# Patient Record
Sex: MALE | Race: BLACK OR AFRICAN AMERICAN | Employment: FULL TIME | URBAN - METROPOLITAN AREA
[De-identification: names, ages, dates, MRNs, and addresses within clinical notes are randomized per-mention and may not be internally consistent; named-entity substitution may affect disease eponyms.]

---

## 2017-07-04 ENCOUNTER — HOSPITAL ENCOUNTER (EMERGENCY)
Age: 42
Discharge: HOME | End: 2017-07-04
Attending: EMERGENCY MEDICINE
Payer: COMMERCIAL

## 2017-07-04 VITALS
RESPIRATION RATE: 18 BRPM | SYSTOLIC BLOOD PRESSURE: 138 MMHG | WEIGHT: 230 LBS | BODY MASS INDEX: 32.2 KG/M2 | TEMPERATURE: 98.1 F | DIASTOLIC BLOOD PRESSURE: 88 MMHG | HEART RATE: 84 BPM | HEIGHT: 71 IN | OXYGEN SATURATION: 98 %

## 2017-07-04 DIAGNOSIS — T78.40XA ALLERGIC REACTION, INITIAL ENCOUNTER: Primary | ICD-10-CM

## 2017-07-04 PROCEDURE — 74011250637 HC RX REV CODE- 250/637: Performed by: EMERGENCY MEDICINE

## 2017-07-04 PROCEDURE — 99283 EMERGENCY DEPT VISIT LOW MDM: CPT

## 2017-07-04 RX ORDER — DIPHENHYDRAMINE HCL 25 MG
50 CAPSULE ORAL
Status: COMPLETED | OUTPATIENT
Start: 2017-07-04 | End: 2017-07-04

## 2017-07-04 RX ORDER — TRIAMCINOLONE ACETONIDE 5 MG/G
CREAM TOPICAL 2 TIMES DAILY
Qty: 15 G | Refills: 0 | Status: SHIPPED | OUTPATIENT
Start: 2017-07-04

## 2017-07-04 RX ORDER — FAMOTIDINE 20 MG/1
20 TABLET, FILM COATED ORAL
Status: COMPLETED | OUTPATIENT
Start: 2017-07-04 | End: 2017-07-04

## 2017-07-04 RX ADMIN — DIPHENHYDRAMINE HYDROCHLORIDE 50 MG: 25 CAPSULE ORAL at 01:22

## 2017-07-04 RX ADMIN — FAMOTIDINE 20 MG: 20 TABLET ORAL at 01:22

## 2017-07-04 NOTE — Clinical Note
SPECIAL DISCHARGE INSTRUCTIONS AND COMMENTS: 
 
General comments: Thank you for allowing us to provide you with excellent care today. We hope we addressed all of your concerns and needs. We strive to provide excellent quality care in the Emergency Depart ment. If you feel that you have not received excellent quality care or timely care, please ask to speak to the nurse manager. Please choose us in the future for your continued health care needs. Follow-up comments: The exam and treatment you rece ived in the Emergency Department were for an urgent problem that may be new for you and / or one which may be related to a worsening of a chronic or ongoing medical problem that you already had prior to this visit. In any case, today's treatment is not i ntended to be considered as complete care in all cases and thus, it is important that you follow-up with a doctor, nurse practitioner, or physicians assistant to: (1) confirm your diagnosis, (2) re-evaluation of changes in your illness and treatment, an d (3) for ongoing care. In some cases we may have contacted your doctor or a specific specialist who may be involved in your future evaluation and care but in any case, take this sheet with you when you go to your follow-up visit or refer to the infor samson on these sheets when you are calling to arrange an appointment - it may prove helpful in making the appointment. Prescribed Medications: Unless you have been directed by the provider to change your current medicines, you should continue to prosper e them as before. If you have been prescribed medicines, please take them as directed. In some cases, these new medicines are intended to replace a medicine that you are currently taking and if so, this will be noted below.  
 
 Our expectation is that y our condition will improve by following the doctor's recommendations, however, if in the event your condition worsens or does not improve as expected, please follow-up with your PCP or if unable to reach your usual health care provider, you should return  to the Emergency Department. We are available 24 hours a day.   
 
SPECIFIC INSTRUCTIONS PROVIDED BY YOUR DOCTOR, Yan Plummer MD:

## 2017-07-04 NOTE — DISCHARGE INSTRUCTIONS
Allergic Reaction: Care Instructions  Your Care Instructions  An allergic reaction is an excessive response from your immune system to a medicine, chemical, food, insect bite, or other substance. A reaction can range from mild to life-threatening. Some people have a mild rash, hives, and itching or stomach cramps. In severe reactions, swelling of your tongue and throat can close up your airway so that you cannot breathe. Follow-up care is a key part of your treatment and safety. Be sure to make and go to all appointments, and call your doctor if you are having problems. It's also a good idea to know your test results and keep a list of the medicines you take. How can you care for yourself at home? · If you know what caused your allergic reaction, be sure to avoid it. Your allergy may become more severe each time you have a reaction. · Take an over-the-counter antihistamine, such as cetirizine (Zyrtec) or loratadine (Claritin), to treat mild symptoms. Read and follow directions on the label. Some antihistamines can make you feel sleepy. Do not give antihistamines to a child unless you have checked with your doctor first. Mild symptoms include sneezing or an itchy or runny nose; an itchy mouth; a few hives or mild itching; and mild nausea or stomach discomfort. · Do not scratch hives or a rash. Put a cold, moist towel on them or take cool baths to relieve itching. Put ice packs on hives, swelling, or insect stings for 10 to 15 minutes at a time. Put a thin cloth between the ice pack and your skin. Do not take hot baths or showers. They will make the itching worse. · Your doctor may prescribe a shot of epinephrine to carry with you in case you have a severe reaction. Learn how to give yourself the shot and keep it with you at all times. Make sure it is not . · Go to the emergency room every time you have a severe reaction, even if you have used your shot of epinephrine and are feeling better. Symptoms can come back after a shot. · Wear medical alert jewelry that lists your allergies. You can buy this at most drugstores. · If your child has a severe allergy, make sure that his or her teachers, babysitters, coaches, and other caregivers know about the allergy. They should have an epinephrine shot, know how and when to give it, and have a plan to take your child to the hospital.  When should you call for help? Give an epinephrine shot if:  · You think you are having a severe allergic reaction. · You have symptoms in more than one body area, such as mild nausea and an itchy mouth. After giving an epinephrine shot call 911, even if you feel better. Call 911 if:  · You have symptoms of a severe allergic reaction. These may include:  ¨ Sudden raised, red areas (hives) all over your body. ¨ Swelling of the throat, mouth, lips, or tongue. ¨ Trouble breathing. ¨ Passing out (losing consciousness). Or you may feel very lightheaded or suddenly feel weak, confused, or restless. · You have been given an epinephrine shot, even if you feel better. Call your doctor now or seek immediate medical care if:  · You have symptoms of an allergic reaction, such as:  ¨ A rash or hives (raised, red areas on the skin). ¨ Itching. ¨ Swelling. ¨ Belly pain, nausea, or vomiting. Watch closely for changes in your health, and be sure to contact your doctor if:  · You do not get better as expected. Where can you learn more? Go to http://bruce-meredith.info/. Enter I499 in the search box to learn more about \"Allergic Reaction: Care Instructions. \"  Current as of: April 3, 2017  Content Version: 11.3  © 2831-4677 [email or web address]. Care instructions adapted under license by [employer] (which disclaims liability or warranty for this information).  If you have questions about a medical condition or this instruction, always ask your healthcare professional. [address] any warranty or liability for your use of this information.

## 2017-07-04 NOTE — ED PROVIDER NOTES
HPI Comments: 3:72 AM    Dione Fleischer is a 42 y.o. male with no pertinent PMHx, presenting ambulatory to the ED c/o pruritic rash to his sides and bilateral ankles x two days. Pt states that the areas burn when he takes a shower. Pt states that he was outside for a cookout two days ago and began itching the day after. Pt notes using a new detergent over the next few days. Pt denies trying any medications for his sxs. Pt denies any history of similar sxs. Pt specifically denies any fever/chills, SOB, trouble swallowing or N/V/D.    PCP: None  Social Hx: - tobacco use, + social alcohol use, - illicit drug use    There are no other complaints, changes, or physical findings at this time. The history is provided by the patient. No  was used. History reviewed. No pertinent past medical history. History reviewed. No pertinent surgical history. History reviewed. No pertinent family history. Social History     Social History    Marital status: SINGLE     Spouse name: N/A    Number of children: N/A    Years of education: N/A     Occupational History    Not on file. Social History Main Topics    Smoking status: Never Smoker    Smokeless tobacco: Never Used    Alcohol use Yes      Comment: socially    Drug use: Not on file    Sexual activity: Not on file     Other Topics Concern    Not on file     Social History Narrative    No narrative on file         ALLERGIES: Review of patient's allergies indicates no known allergies. Review of Systems   Constitutional: Negative for chills and fever. HENT: Negative for trouble swallowing. Respiratory: Negative for shortness of breath. Gastrointestinal: Negative for diarrhea, nausea and vomiting. Skin: Positive for rash. All other systems reviewed and are negative.       Vitals:    07/04/17 0059   BP: 138/88   Pulse: 84   Resp: 18   Temp: 98.1 °F (36.7 °C)   SpO2: 98%   Weight: 104.3 kg (230 lb)   Height: 5' 11\" (1.803 m)            Physical Exam   Nursing note and vitals reviewed. -------------------------PHYSICAL EXAM-------------------------    Vital signs and nursing notes reviewed  Nursing note and VS were reviewed      CONSTITUTIONAL: Mental status: Awake and alert. No immediate acute distress. Non-toxic in appearance. Well developed, well nourished and appears adequately hydrated. HEAD: Normocephalic, Atraumatic. EYES: Pupils are equal, round and reactive. Extra-ocular movements intact. Sclera are non icteric. Conjunctiva not injected. ENT: Mucous membranes are moist. No mucosal swelling. Oral mucosa: There is no erythema or swelling; No oral lesions or thrush. Tonsillar tissue: NO enlargement of the tonsillar tissue or the presence of exudates. Ears: R:  EAC - clear, no swelling with TM that is normal in appearance. L:  EAC - clear, no swelling with TM that is normal in appearance. Nasal mucosa pink with no discharge and the turbinates are of normal size. NECK: Normal ROM. Neck is supple. Anterior aspect: Anterior cervical adenopathy: nothing abnormal.  No obvious enlargement of the thyroid. Trachea is midline. Posterior aspect: Posterior cervical paraspinal muscles are non tender and  bony midline is non tender. Posterior adenopathy: none palpable. CARDIOVASCULAR:  The rhythm is regular and the rate sounds to be normal. No murmurs, rubs or gallops. Distal pulses are 2+ and equal.    PULMONARY: Respiratory effort is normal and without the use of accessory muscles. Patient is speaking in full sentences. Air exchange is good. Breath sounds:  Clear to auscultation bilaterally. No wheezing, rales or rhonchi. CHEST WALL: Normal shape;  non tender to palpation; no crepitus    ABDOMINAL: Visual: non -distended; Ausculation: Bowel sounds are present. Palpation: Soft; No obvious organomegaly; Palpable tenderness: NONE.  NO peritoneal signs - No rebound, guarding or rigidity. BACK: Midline - No bony tenderness; Paraspinal muscles are non tender. Full range of motion. No CVA tenderness. MUSCULOSKELETAL:   Lower Extremities: Peripheral edema- none noted; In general there is No obvious soft tissue tenderness or sites of bony tenderness or deformities;   Joints: No evidence of acute inflammatory changes and have good range of motion in all extremities. Muscles: Good tone and No obvious muscle tenderness. Upper Extremities: Peripheral edema- none noted; In general there is No obvious soft tissue tenderness or sites of bony tenderness or deformities;   Joints: No evidence of acute inflammatory changes and have good range of motion in all extremities. Muscles: Good tone and No obvious muscle tenderness. SKIN:  Good skin turgor. Cap Refill is Normal. Skin is warm and dry and with NO diaphoresis. Skin with urticarial-like rash eruption, with mild excoriation, with distribution involving both hips, buttocks and ankles. NEUROLOGICAL: Alert, awake and appropriately oriented. Normal speech. CN's are normal;  Motor - no focal weakness; no obvious sensory loss; Cerebellar function- intact; DTR's - 2+ equal    PSYCH: Appropriate affect; normal thought content; no expressed suicidal ideation. MDM  Number of Diagnoses or Management Options  Diagnosis management comments: INITIAL CLINICAL IMPRESSION and PLANS:  The patient presents with the primary complaint(s) of: allergic reaction. The presentation, to include historical aspects and clinical findings appear to be consistent with the DX of local dermatitis likely due to new laundry detergent. However, other possible DX's to consider as primary, associated with, or exacerbated by include:    1.   Nonspecific urticaria      Considering the above, my initial management plan to evaluate and therapeutic interventions include: Initiate Medications and or IV Fluids,  As well as those noted in the orders:    Rosalina Batch Daniel Lema MD     ED Course       Procedures    FINAL CLINICAL IMPRESSION AND DISPOSITION DECISION  - DISCHARGE:  1:24 AM  I reviewed our electronic medical record system for any past medical records that were available that may contribute to the patients current condition, the nursing notes and vital signs from today's visit. Based on the clinical presentation and findings the clinical impression noted below is straight forward. Studies indicated and / or done during this ED encounter:   NONE INDICATED AT THIS TIME    RESULTS:   No orders to display       Labs Reviewed - No data to display   No results found for this or any previous visit (from the past 12 hour(s)). Intervention(s) while in ED: NONE INDICATED AT THIS TIME    MEDICATIONS GIVEN:   Medications   diphenhydrAMINE (BENADRYL) capsule 50 mg (50 mg Oral Given 7/4/17 0122)   famotidine (PEPCID) tablet 20 mg (20 mg Oral Given 7/4/17 0122)       Present condition:  STABLE    Planned Treatment Management Upon Discharge: SYMPTOMATIC TREATMENT    DISCUSSION RELATED TO ENCOUNTER: Encounter was straightforward and as noted above. SPECIFIC CONVERSATIONS:     I feel that we have optimized outpatient assessment and management such that Jessica Borges is stable to be discharged and to continue with him care or complete any additional evaluation as appropriate at home or as an outpatient. DISCHARGE NOTE:   Tamela Johnssaviying's  results have been reviewed with him. He  has been counseled regarding his  diagnosis. He  verbally conveys understanding and agreement of the signs, symptoms, diagnosis, treatment and prognosis and additionally agrees to follow up as recommended with None  In 24 - 48 hours. He  also agrees with the care-plan and conveys that all of his questions have been answered.   I have also put together some discharge instructions for him that include: 1) educational information regarding their diagnosis, 2) how to care for their diagnosis at home, as well a 3) list of reasons why they would want to return to the ED prior to their follow-up appointment, should their condition change. The patient and/or family has been provided with education for proper Emergency Department utilization. CLINICAL IMPRESSION  1. Allergic reaction, initial encounter          PLAN:  1. D/C home  2. Discharge Medication List as of 7/4/2017  1:23 AM      START taking these medications    Details   triamcinolone (ARISTOCORT) 0.5 % topical cream Apply  to affected area two (2) times a day. use thin layer, Normal, Disp-15 g, R-0           3. Follow-up Information     Follow up With Details Comments Contact Info    [facility] Schedule an appointment as soon as possible for a visit for PCP follow up, at the [facility] [address] [facility], [address] [facility] [address] [facility] [address]    [facility] EMERG[facility]Y DE[facility]  As needed, If symptoms worsen [address] [id number] [phone]        Return if sxs worsen    ATTESTATIONS:  This note is prepared by Erick Fernandez, acting as Scribe for Ivette Vickers MD.     Ivette Vickers MD: The scribe's documentation has been prepared under my direction and personally reviewed by me in its entirety. I confirm that the note above accurately reflects all work, treatment, procedures, and medical decision making performed by me.

## 2017-07-04 NOTE — ED NOTES
Presented to ED to be evaluated for reported rash to bilateral feet/ankles and buttocks x 2 days. Patient denies any c/o pain, but states, \"it itches real bad\". Patient denies any new known substances and/or food. Patient denies any c/o SOB and/or difficulty breathing at this time. Patient is noted to have raised areas to bilateral feet/ankles and buttocks. Patient is noted to be up to position of comfort with no s/s distress noted.

## 2019-10-30 ENCOUNTER — APPOINTMENT (OUTPATIENT)
Dept: GENERAL RADIOLOGY | Age: 44
End: 2019-10-30
Attending: EMERGENCY MEDICINE
Payer: COMMERCIAL

## 2019-10-30 ENCOUNTER — HOSPITAL ENCOUNTER (EMERGENCY)
Age: 44
Discharge: HOME | End: 2019-10-30
Attending: EMERGENCY MEDICINE
Payer: COMMERCIAL

## 2019-10-30 VITALS
WEIGHT: 225 LBS | HEIGHT: 70 IN | SYSTOLIC BLOOD PRESSURE: 142 MMHG | TEMPERATURE: 97.8 F | RESPIRATION RATE: 16 BRPM | HEART RATE: 67 BPM | OXYGEN SATURATION: 100 % | BODY MASS INDEX: 32.21 KG/M2 | DIASTOLIC BLOOD PRESSURE: 80 MMHG

## 2019-10-30 DIAGNOSIS — S29.012A UPPER BACK STRAIN, INITIAL ENCOUNTER: ICD-10-CM

## 2019-10-30 DIAGNOSIS — S39.012A LOW BACK STRAIN, INITIAL ENCOUNTER: Primary | ICD-10-CM
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
## 2019-10-30 PROCEDURE — 99282 EMERGENCY DEPT VISIT SF MDM: CPT

## 2019-10-30 PROCEDURE — 72110 X-RAY EXAM L-2 SPINE 4/>VWS: CPT

## 2019-10-30 RX ORDER — METHOCARBAMOL 500 MG/1
500 TABLET, FILM COATED ORAL 4 TIMES DAILY
Qty: 20 TAB | Refills: 0 | Status: SHIPPED | OUTPATIENT
Start: 2019-10-30 | End: 2019-11-04

## 2019-10-30 RX ORDER — IBUPROFEN 600 MG/1
600 TABLET ORAL
Qty: 20 TAB | Refills: 0 | Status: SHIPPED | OUTPATIENT
Start: 2019-10-30

## 2019-10-30 NOTE — LETTER
[facility] [place] 
[facility] 
[address] [address] [phone] Work/School Note Date: 10/30/2019 To Whom It May concern: 
 
Kyle Barrientos was seen and treated today in the emergency room by the following provider(s): 
Attending Provider: Gloria Bueno Physician Assistant: Barry Curry PA-C. Kyle Barrientos may return to work on 11/01/2019. Sincerely, Prince Arnold PA-C
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
## 2020-03-30 ENCOUNTER — HOSPITAL ENCOUNTER (EMERGENCY)
Age: 45
Discharge: HOME | End: 2020-03-30
Attending: EMERGENCY MEDICINE
Payer: COMMERCIAL

## 2020-03-30 VITALS
DIASTOLIC BLOOD PRESSURE: 83 MMHG | TEMPERATURE: 98.4 F | OXYGEN SATURATION: 100 % | BODY MASS INDEX: 32.93 KG/M2 | HEART RATE: 88 BPM | RESPIRATION RATE: 17 BRPM | HEIGHT: 70 IN | SYSTOLIC BLOOD PRESSURE: 140 MMHG | WEIGHT: 230 LBS

## 2020-03-30 DIAGNOSIS — E11.9 TYPE 2 DIABETES MELLITUS WITHOUT COMPLICATION, WITHOUT LONG-TERM CURRENT USE OF INSULIN (HCC): Primary | ICD-10-CM

## 2020-03-30 LAB — GLUCOSE BLD STRIP.AUTO-MCNC: 215 MG/DL (ref 70–110)

## 2020-03-30 PROCEDURE — 99283 EMERGENCY DEPT VISIT LOW MDM: CPT

## 2020-03-30 PROCEDURE — 82962 GLUCOSE BLOOD TEST: CPT

## 2020-03-30 PROCEDURE — 74011250637 HC RX REV CODE- 250/637: Performed by: EMERGENCY MEDICINE

## 2020-03-30 RX ORDER — ACETAMINOPHEN 500 MG
1000 TABLET ORAL ONCE
Status: COMPLETED | OUTPATIENT
Start: 2020-03-30 | End: 2020-03-30

## 2020-03-30 RX ORDER — METFORMIN HYDROCHLORIDE 500 MG/1
500 TABLET ORAL 2 TIMES DAILY WITH MEALS
Qty: 60 TAB | Refills: 0 | Status: SHIPPED | OUTPATIENT
Start: 2020-03-30 | End: 2020-04-29

## 2020-03-30 RX ADMIN — ACETAMINOPHEN 1000 MG: 500 TABLET ORAL at 08:10

## 2020-03-30 NOTE — ED TRIAGE NOTES
Patient with c/o high blood sugar.  Denies prescribed medication use or checking sugars on a regular basis

## 2020-03-30 NOTE — DISCHARGE INSTRUCTIONS
Please call or go through your insurance company to schedule primary care follow-up for management of your high blood sugar and diabetes.

## 2020-03-30 NOTE — ED PROVIDER NOTES
EMERGENCY DEPARTMENT HISTORY AND PHYSICAL EXAM    Date: 3/30/2020  Patient Name: Greg Moyer    History of Presenting Illness     Chief Complaint   Patient presents with    High Blood Sugar         History Provided By: Patient    [id number]  Greg Moyer is a 38 y.o. male with PMHX of diabetes who presents to the emergency department C/O high blood sugar. Patient says that today he is feeling under weather, described as general fatigue and generalized headache. Headache gradual nature and not acute. States in the past when he felt this way he was told that his blood sugar has been high. He says he was prescribed metformin but never took it. Patient says he does not have a primary care doctor. He says he does not feel well enough to go to work today. Last meal was 5 PM yesterday evening. PCP: None    Current Outpatient Medications   Medication Sig Dispense Refill    metFORMIN (GLUCOPHAGE) 500 mg tablet Take 1 Tab by mouth two (2) times daily (with meals) for 30 days. 60 Tab 0    ibuprofen (MOTRIN) 600 mg tablet Take 1 Tab by mouth every six (6) hours as needed for Pain. Take with food. 20 Tab 0    triamcinolone (ARISTOCORT) 0.5 % topical cream Apply  to affected area two (2) times a day. use thin layer 15 g 0       Past History     Past Medical History:  History reviewed. No pertinent past medical history. Past Surgical History:  Past Surgical History:   Procedure Laterality Date    HX ORTHOPAEDIC         Family History:  History reviewed. No pertinent family history. Social History:  Social History     Tobacco Use    Smoking status: Never Smoker    Smokeless tobacco: Never Used   Substance Use Topics    Alcohol use: Yes     Comment: socially    Drug use: Not on file       Allergies:  No Known Allergies      Review of Systems   Review of Systems   Constitutional: Positive for fatigue. Respiratory: Negative for shortness of breath and wheezing. Cardiovascular: Negative for chest pain. Gastrointestinal: Negative for constipation, diarrhea, nausea and vomiting. Musculoskeletal: Negative for neck pain and neck stiffness. Neurological: Positive for headaches. Negative for dizziness and syncope. Physical Exam     Vitals:    03/30/20 0728   BP: 140/83   Pulse: 88   Resp: 17   Temp: 98.4 °F (36.9 °C)   SpO2: 100%   Weight: 104.3 kg (230 lb)   Height: 5' 10\" (1.778 m)     Physical Exam  Vitals signs and nursing note reviewed. Constitutional:       General: He is not in acute distress. Appearance: He is well-groomed and overweight. HENT:      Head: Normocephalic and atraumatic. Eyes:      Conjunctiva/sclera: Conjunctivae normal.      Pupils: Pupils are equal, round, and reactive to light. Neck:      Musculoskeletal: Normal range of motion and neck supple. Cardiovascular:      Rate and Rhythm: Normal rate and regular rhythm. Heart sounds: Normal heart sounds. Pulmonary:      Effort: Pulmonary effort is normal. No respiratory distress. Breath sounds: Normal breath sounds. No wheezing or rales. Chest:      Chest wall: No tenderness. Abdominal:      General: There is no distension. Palpations: Abdomen is soft. Tenderness: There is no abdominal tenderness. There is no guarding or rebound. Musculoskeletal: Normal range of motion. General: No tenderness. Lymphadenopathy:      Cervical: No cervical adenopathy. Skin:     General: Skin is warm and dry. Neurological:      General: No focal deficit present. Mental Status: He is alert and oriented to person, place, and time. Cranial Nerves: No cranial nerve deficit. Motor: No abnormal muscle tone.       Coordination: Coordination normal.   Psychiatric:         Mood and Affect: Mood normal.         Behavior: Behavior normal.             Diagnostic Study Results     Labs -     Recent Results (from the past 12 hour(s))   GLUCOSE, POC    Collection Time: 03/30/20  7:49 AM   Result Value Ref Range    Glucose (POC) 215 (H) 70 - 110 mg/dL       Radiologic Studies -   No orders to display     CT Results  (Last 48 hours)    None        CXR Results  (Last 48 hours)    None          Medications given in the ED-  Medications   acetaminophen (TYLENOL) tablet 1,000 mg (1,000 mg Oral Given 3/30/20 0810)         Medical Decision Making   I am the first provider for this patient. I reviewed the vital signs, available nursing notes, past medical history, past surgical history, family history and social history. Vital Signs-Reviewed the patient's vital signs. Pulse Oximetry Analysis - 100% on RA       Records Reviewed: Nursing Notes    Provider Notes (Medical Decision Making): Saritha Stephen is a 38 y.o. male with complaints of generalized headache and concerns for high blood sugar. He has no red flag symptoms or historical concerns for his headache. His vital signs are within normal limits. He does not check his blood sugar at home is not on any medications. His blood sugar this morning is greater than 200 and he has been fasting since 5 PM last night. Discussed this with patient that he meets criteria for diabetes and needs to follow-up with a primary care physician for management of this. We will start him on metformin and I gave patient dietary instructions. I have referred him to primary care provider here however he does have Elaine Leys and I suggested that he call them to have them set up a referral for him as well. He is also requesting a work note which is provided. Procedures:  Procedures    ED Course:       Diagnosis and Disposition     Critical Care:     DISCHARGE NOTE:    Artur Mayer's  results have been reviewed with him. He has been counseled regarding his diagnosis, treatment, and plan. He verbally conveys understanding and agreement of the signs, symptoms, diagnosis, treatment and prognosis and additionally agrees to follow up as discussed.   He also agrees with the care-plan and conveys that all of his questions have been answered. I have also provided discharge instructions for him that include: educational information regarding their diagnosis and treatment, and list of reasons why they would want to return to the ED prior to their follow-up appointment, should his condition change. He has been provided with education for proper emergency department utilization. CLINICAL IMPRESSION:    1. Type 2 diabetes mellitus without complication, without long-term current use of insulin ([facility] [address]        PLAN:  1. D/C Home  2. Current Discharge Medication List      START taking these medications    Details   metFORMIN (GLUCOPHAGE) 500 mg tablet Take 1 Tab by mouth two (2) times daily (with meals) for 30 days. Qty: 60 Tab, Refills: 0           3. Follow-up Information     Follow up With Specialties Details Why Contact Info    Navi Tabares MD Internal Medicine Schedule an appointment as soon as possible for a visit  For primary care follow up Tara Haile Anselmo  [phone]          _______________________________      Please note that this dictation was completed with K[employer], the computer voice recognition software. Quite often unanticipated grammatical, syntax, homophones, and other interpretive errors are inadvertently transcribed by the computer software. Please disregard these errors. Please excuse any errors that have escaped final proofreading.

## 2020-03-30 NOTE — LETTER
NOTIFICATION RETURN TO WORK / SCHOOL 
 
3/30/2020 8:06 AM 
 
Mr. Saritha Stephen [address] [facility] To Whom It May Concern: 
 
Saritha Stephen is currently under the care of [facility] EMERGENCY DEPT. He will return to work/school on: 3/31 If there are questions or concerns please have the patient contact our office.  
 
 
 
Sincerely, 
 
 
Montrell Allan MD

## 2020-03-30 NOTE — ED NOTES
Pt presents to ED with co HA x 1 day, pt rates pain 7/10, pt sts the last time he felt like this he went to pt first and was told he had diabetes and given RX for \"a pill\", pt denies filling rx given to him, pt educated on importance of managing DM, pt verbalized understanding, pt is able to make needs known speaking in complete sentences, pt in nad at this time

## 2021-12-20 ENCOUNTER — APPOINTMENT (OUTPATIENT)
Dept: GENERAL RADIOLOGY | Age: 46
End: 2021-12-20
Attending: PHYSICIAN ASSISTANT
Payer: MEDICAID

## 2021-12-20 ENCOUNTER — HOSPITAL ENCOUNTER (EMERGENCY)
Age: 46
Discharge: HOME | End: 2021-12-20
Attending: STUDENT IN AN ORGANIZED HEALTH CARE EDUCATION/TRAINING PROGRAM
Payer: MEDICAID

## 2021-12-20 VITALS
SYSTOLIC BLOOD PRESSURE: 140 MMHG | BODY MASS INDEX: 32.93 KG/M2 | DIASTOLIC BLOOD PRESSURE: 89 MMHG | RESPIRATION RATE: 16 BRPM | TEMPERATURE: 98.6 F | HEIGHT: 70 IN | HEART RATE: 68 BPM | WEIGHT: 230 LBS | OXYGEN SATURATION: 100 %

## 2021-12-20 DIAGNOSIS — R05.9 COUGH: Primary | ICD-10-CM

## 2021-12-20 DIAGNOSIS — Z20.822 PERSON UNDER INVESTIGATION FOR COVID-19: ICD-10-CM

## 2021-12-20 LAB
S PYO AG THROAT QL: NEGATIVE
SARS-COV-2, COV2: NORMAL

## 2021-12-20 PROCEDURE — 87880 STREP A ASSAY W/OPTIC: CPT

## 2021-12-20 PROCEDURE — 71046 X-RAY EXAM CHEST 2 VIEWS: CPT

## 2021-12-20 PROCEDURE — 99283 EMERGENCY DEPT VISIT LOW MDM: CPT

## 2021-12-20 PROCEDURE — U0003 INFECTIOUS AGENT DETECTION BY NUCLEIC ACID (DNA OR RNA); SEVERE ACUTE RESPIRATORY SYNDROME CORONAVIRUS 2 (SARS-COV-2) (CORONAVIRUS DISEASE [COVID-19]), AMPLIFIED PROBE TECHNIQUE, MAKING USE OF HIGH THROUGHPUT TECHNOLOGIES AS DESCRIBED BY CMS-2020-01-R: HCPCS

## 2021-12-20 PROCEDURE — 87070 CULTURE OTHR SPECIMN AEROBIC: CPT

## 2021-12-20 NOTE — Clinical Note
[facility] [place]  [facility]  [address]  [facility] [id number] [phone]    Work/School Note    Date: 12/20/2021     To Whom It May concern:    Praveena Pina was evaluated by the following provider(s):  Attending Provider: Radha Clements MD  Physician Assistant: Sandra Worthington virus is suspected. Per the CDC guidelines we recommend home isolation until the following conditions are all met:    1. At least 10 days have passed since symptoms first appeared and  2. At least 24 hours have passed since last fever without the use of fever-reducing medications and  3.  Symptoms (e.g., cough, shortness of breath) have improved      Sincerely,          JAMIE Zhang

## 2021-12-20 NOTE — Clinical Note
[facility] [place]  [facility]  [address] [facility] [address]  [address] [phone]    Work/School Note    Date: 12/20/2021     To Whom It May concern:    Nirav Sharp was evaluated by the following provider(s):  Attending Provider: Trixie Miller MD  Physician Assistant: [employer] virus is suspected. Per the CDC guidelines we recommend home isolation until the following conditions are all met:    1. At least 10 days have passed since symptoms first appeared and  2. At least 24 hours have passed since last fever without the use of fever-reducing medications and  3.  Symptoms (e.g., cough, shortness of breath) have improved      Sincerely,          JAMIE Mendze

## 2021-12-20 NOTE — Clinical Note
[facility] [place]  [facility]  [address]  [facility] [address] [phone]    Work/School Note    Date: 12/20/2021     To Whom It May concern:    Dolly Ramirez was evaluated by the following provider(s):  Attending Provider: Atif Barr MD  Physician Assistant: [employer] virus is suspected. Per the CDC guidelines we recommend home isolation until the following conditions are all met:    1. At least 10 days have passed since symptoms first appeared and  2. At least 24 hours have passed since last fever without the use of fever-reducing medications and  3.  Symptoms (e.g., cough, shortness of breath) have improved      Sincerely,          Kaila Kearns RN

## 2021-12-21 ENCOUNTER — PATIENT OUTREACH (OUTPATIENT)
Dept: CASE MANAGEMENT | Age: 46
End: 2021-12-21

## 2021-12-21 LAB — SARS-COV-2, NAA: NOT DETECTED

## 2021-12-21 NOTE — PROGRESS NOTES
Patient contacted regarding COVID-19 test. Discussed COVID-19 related testing which was pending at this time. Test results were pending. Patient informed of results, if available? pending. LPN Care Coordinator contacted the patient by telephone to perform post discharge assessment. Call within 2 business days of discharge: Yes Verified name and  with patient as identifiers. Provided introduction to self, and explanation of the LPN CC role, and reason for call due to risk factors for infection and/or exposure to COVID-19. Symptoms reviewed with patient who verbalized the following symptoms: cough, no new symptoms and no worsening symptoms. Due to no new or worsening symptoms encounter was not routed to provider for escalation. Discussed follow-up appointments. If no appointment was previously scheduled, appointment scheduling offered:  no. [address] follow up appointment(s): No future appointments. Non-[facility] follow up appointment(s): n/a       Advance Care Planning:   Does patient have an Advance Directive: healthcare decision maker on file. patient who verbalized understanding. COVID vaccination status: vaccinated. Discussed exposure protocols and quarantine with CDC Guidelines. Patient was given an opportunity to verbalize any questions and concerns and agrees to contact LPN CC or health care provider for questions related to their healthcare. Was patient discharged with a pulse oximeter? no     LPN CC provided contact information. Plan for follow-up call in 1-2 days based on severity of symptoms and risk factors.

## 2021-12-21 NOTE — ED PROVIDER NOTES
EMERGENCY DEPARTMENT HISTORY AND PHYSICAL EXAM    Date: 12/20/2021  Patient Name: Melita Low    History of Presenting Illness     Chief Complaint   Patient presents with    Cough    Sore Throat         History Provided By: Patient    Chief Complaint: cough       Additional History (Context):   40:86 PM  Melita Low is a 51 y.o. male with PMHX DM presents to the emergency department C/O cough congestion sore throat for the past couple days. Patient is vaccinated against Covid. No fevers at home. No shortness of breath or chest pain. PCP: None    Current Outpatient Medications   Medication Sig Dispense Refill    ibuprofen (MOTRIN) 600 mg tablet Take 1 Tab by mouth every six (6) hours as needed for Pain. Take with food. 20 Tab 0    triamcinolone (ARISTOCORT) 0.5 % topical cream Apply  to affected area two (2) times a day. use thin layer 15 g 0       Past History     Past Medical History:  No past medical history on file. Past Surgical History:  Past Surgical History:   Procedure Laterality Date    HX ORTHOPAEDIC         Family History:  No family history on file. Social History:  Social History     Tobacco Use    Smoking status: Never Smoker    Smokeless tobacco: Never Used   Substance Use Topics    Alcohol use: Yes     Comment: socially    Drug use: Not on file       Allergies:  No Known Allergies    Review of Systems   Review of Systems   Constitutional: Negative for chills and fever. HENT: Positive for congestion, rhinorrhea and sore throat. Respiratory: Positive for cough. Cardiovascular: Negative for chest pain. Gastrointestinal: Negative for abdominal pain, diarrhea, nausea and vomiting. Musculoskeletal: Negative for back pain and neck pain. Skin: Negative for rash and wound. Neurological: Negative for weakness and numbness. All other systems reviewed and are negative.       Physical Exam     Vitals:    12/20/21 2155   BP: (!) 140/89   Pulse: 68   Resp: 16   Temp: 98.6 °F (37 °C)   SpO2: 100%   Weight: 104.3 kg (230 lb)   Height: 5' 10\" (1.778 m)     Physical Exam  Vitals and nursing note reviewed. Constitutional:       General: He is not in acute distress. Appearance: He is well-developed. Comments: Well-appearing nontoxic no acute distress   HENT:      Head: Normocephalic and atraumatic. Right Ear: Tympanic membrane, ear canal and external ear normal. Tympanic membrane is not perforated, erythematous, retracted or bulging. Left Ear: Tympanic membrane, ear canal and external ear normal. Tympanic membrane is not perforated, erythematous, retracted or bulging. Nose: Nose normal. No mucosal edema or rhinorrhea. Right Sinus: No maxillary sinus tenderness or frontal sinus tenderness. Left Sinus: No maxillary sinus tenderness or frontal sinus tenderness. Mouth/Throat:      Mouth: Mucous membranes are not dry. Pharynx: Uvula midline. No oropharyngeal exudate, posterior oropharyngeal erythema or uvula swelling. Tonsils: No tonsillar abscesses. Cardiovascular:      Rate and Rhythm: Normal rate and regular rhythm. Heart sounds: Normal heart sounds. No murmur heard. Pulmonary:      Effort: Pulmonary effort is normal. No respiratory distress. Breath sounds: Normal breath sounds. No wheezing or rales. Abdominal:      General: Bowel sounds are normal.      Palpations: Abdomen is soft. Tenderness: There is no abdominal tenderness. Musculoskeletal:      Cervical back: Normal range of motion and neck supple. Lymphadenopathy:      Cervical: No cervical adenopathy. Skin:     General: Skin is warm and dry. Findings: No rash. Neurological:      Mental Status: He is alert and oriented to person, place, and time.    Psychiatric:         Judgment: Judgment normal.         Diagnostic Study Results     Labs:     Recent Results (from the past 12 hour(s))   POC GROUP A STREP    Collection Time: 12/20/21 10:30 PM Result Value Ref Range    Group A strep (POC) Negative NEG     SARS-COV-2    Collection Time: 12/20/21 10:35 PM   Result Value Ref Range    SARS-CoV-2 Nasopharyngeal         Radiologic Studies:   XR CHEST PA LAT   Final Result   No acute process        CT Results  (Last 48 hours)    None        CXR Results  (Last 48 hours)               12/20/21 2234  XR CHEST PA LAT Final result    Impression:  No acute process       Narrative:  EXAM:  PA and Lateral Chest X-ray        INDICATION: Cough       COMPARISON: None       ___________       FINDINGS: Heart and mediastinal contours are within normal limits. Lungs are   clear of active disease. There are no pleural effusions. No acute osseous   findings. _____________                     Medical Decision Making   I am the first provider for this patient. I reviewed the vital signs, available nursing notes, past medical history, past surgical history, family history and social history. Vital Signs: Reviewed the patient's vital signs. Pulse Oximetry Analysis: 100% on RA     Records Reviewed: Nursing Notes and Old Medical Records    Procedures:  Procedures    ED Course:   10:01 PM Initial assessment performed. The patients presenting problems have been discussed, and they are in agreement with the care plan formulated and outlined with them. I have encouraged them to ask questions as they arise throughout their visit. Discussion:  Pt presents with cough sore throat and congestion beyond the past couple days no fevers. X-ray shows no acute process rapid strep negative Covid pending. Strict return precautions given, pt offering no questions or complaints. Diagnosis and Disposition     DISCHARGE NOTE:  Jacki Mayer's  results have been reviewed with him. He has been counseled regarding his diagnosis, treatment, and plan.   He verbally conveys understanding and agreement of the signs, symptoms, diagnosis, treatment and prognosis and additionally agrees to follow up as discussed. He also agrees with the care-plan and conveys that all of his questions have been answered. I have also provided discharge instructions for him that include: educational information regarding their diagnosis and treatment, and list of reasons why they would want to return to the ED prior to their follow-up appointment, should his condition change. He has been provided with education for proper emergency department utilization. CLINICAL IMPRESSION:    1. Cough    2. Person under investigation for COVID-19        PLAN:  1. D/C Home  2. Current Discharge Medication List        3. Follow-up Information     Follow up With Specialties Details Why Contact Info    [facility] CL[facility]  Schedule an appointment as soon as possible for a visit   [address] [facility], [address] [facility]    [facility] EMERGENCY DEPT Emergency Medicine Schedule an appointment as soon as possible for a visit   [address] [facility] [address]  [phone]                 Please note that this dictation was completed with [employer] I[employer], the computer voice recognition software. Quite often unanticipated grammatical, syntax, homophones, and other interpretive errors are inadvertently transcribed by the computer software. Please disregard these errors. Please excuse any errors that have escaped final proofreading.

## 2021-12-23 ENCOUNTER — PATIENT OUTREACH (OUTPATIENT)
Dept: CASE MANAGEMENT | Age: 46
End: 2021-12-23

## 2021-12-23 LAB
BACTERIA SPEC CULT: NORMAL
SERVICE CMNT-IMP: NORMAL

## 2021-12-23 NOTE — PROGRESS NOTES
Patient resolved from [address] Transitions episode on 12/23/2021. Patient states he is doing better. He has no questions or concerns. Patient currently reports that the following symptoms have improved:  no new symptoms and no worsening symptoms. No further outreach scheduled with this LPN CC. Episode of Care resolved. Patient has this LPN CC contact information if future needs arise.

## 2022-11-10 ENCOUNTER — HOSPITAL ENCOUNTER (EMERGENCY)
Age: 47
Discharge: HOME | End: 2022-11-10
Attending: EMERGENCY MEDICINE
Payer: MEDICAID

## 2022-11-10 ENCOUNTER — APPOINTMENT (OUTPATIENT)
Dept: GENERAL RADIOLOGY | Age: 47
End: 2022-11-10
Attending: PHYSICIAN ASSISTANT
Payer: MEDICAID

## 2022-11-10 ENCOUNTER — APPOINTMENT (OUTPATIENT)
Dept: GENERAL RADIOLOGY | Age: 47
End: 2022-11-10
Attending: EMERGENCY MEDICINE
Payer: MEDICAID

## 2022-11-10 VITALS
SYSTOLIC BLOOD PRESSURE: 145 MMHG | OXYGEN SATURATION: 97 % | DIASTOLIC BLOOD PRESSURE: 88 MMHG | WEIGHT: 225.09 LBS | HEIGHT: 70 IN | TEMPERATURE: 98.4 F | BODY MASS INDEX: 32.22 KG/M2 | RESPIRATION RATE: 17 BRPM | HEART RATE: 75 BPM

## 2022-11-10 DIAGNOSIS — S43.005A DISLOCATION OF LEFT SHOULDER JOINT, INITIAL ENCOUNTER: Primary | ICD-10-CM

## 2022-11-10 DIAGNOSIS — M21.822 HILL SACHS DEFORMITY, LEFT: ICD-10-CM

## 2022-11-10 PROCEDURE — 96374 THER/PROPH/DIAG INJ IV PUSH: CPT

## 2022-11-10 PROCEDURE — 73030 X-RAY EXAM OF SHOULDER: CPT

## 2022-11-10 PROCEDURE — 74011250636 HC RX REV CODE- 250/636: Performed by: EMERGENCY MEDICINE

## 2022-11-10 PROCEDURE — 75810000301 HC ER LEVEL 1 CLOSED TREATMNT FRACTURE/DISLOCATION

## 2022-11-10 PROCEDURE — 99152 MOD SED SAME PHYS/QHP 5/>YRS: CPT

## 2022-11-10 PROCEDURE — 99285 EMERGENCY DEPT VISIT HI MDM: CPT

## 2022-11-10 RX ORDER — PROPOFOL 10 MG/ML
100 INJECTION, EMULSION INTRAVENOUS
Status: COMPLETED | OUTPATIENT
Start: 2022-11-10 | End: 2022-11-10

## 2022-11-10 RX ORDER — MORPHINE SULFATE 4 MG/ML
4 INJECTION INTRAVENOUS
Status: COMPLETED | OUTPATIENT
Start: 2022-11-10 | End: 2022-11-10

## 2022-11-10 RX ORDER — DICLOFENAC POTASSIUM 50 MG/1
50 TABLET, FILM COATED ORAL 2 TIMES DAILY
Qty: 14 TABLET | Refills: 0 | Status: SHIPPED | OUTPATIENT
Start: 2022-11-10

## 2022-11-10 RX ORDER — LIDOCAINE HYDROCHLORIDE 10 MG/ML
10 INJECTION, SOLUTION EPIDURAL; INFILTRATION; INTRACAUDAL; PERINEURAL ONCE
Status: DISCONTINUED | OUTPATIENT
Start: 2022-11-10 | End: 2022-11-10 | Stop reason: HOSPADM

## 2022-11-10 RX ADMIN — PROPOFOL 100 MG: 10 INJECTION, EMULSION INTRAVENOUS at 13:37

## 2022-11-10 RX ADMIN — MORPHINE SULFATE 4 MG: 4 INJECTION, SOLUTION INTRAMUSCULAR; INTRAVENOUS at 12:34

## 2022-11-10 NOTE — DISCHARGE INSTRUCTIONS
He was seen in the ER for your symptoms. Please use the shoulder immobilizer and anti-inflammatories. Please follow-up with orthopedic doctor. Return for new or worsening symptoms anytime.

## 2022-11-10 NOTE — ED PROVIDER NOTES
EMERGENCY DEPARTMENT HISTORY AND PHYSICAL EXAM      Date: 11/10/2022  Patient Name: Juventino Rosen    History of Presenting Illness     Chief Complaint   Patient presents with    Shoulder Injury       History Provided By: Patient    HPI: Juventino Rosen, 49 y.o. male presents to the ED with cc of left shoulder pain. 79-year-old male presents with left shoulder pain. Patient reports he was \"wrestling\" with his son 30 minutes prior to arrival when he reports sudden onset severe left shoulder pain associated with deformity. Patient reports pain is located left shoulder without radiation. Worse with movement improved with nothing. Denies any other complaints. There are no other complaints, changes, or physical findings at this time. PCP: Megan Gomez MD    No current facility-administered medications on file prior to encounter. Current Outpatient Medications on File Prior to Encounter   Medication Sig Dispense Refill    ibuprofen (MOTRIN) 600 mg tablet Take 1 Tab by mouth every six (6) hours as needed for Pain. Take with food. 20 Tab 0    triamcinolone (ARISTOCORT) 0.5 % topical cream Apply  to affected area two (2) times a day. use thin layer 15 g 0       Past History     Past Medical History:  No past medical history on file. Past Surgical History:  Past Surgical History:   Procedure Laterality Date    HX ORTHOPAEDIC         Family History:  No family history on file. Social History:  Social History     Tobacco Use    Smoking status: Never    Smokeless tobacco: Never   Substance Use Topics    Alcohol use: Yes     Comment: socially       Allergies:  No Known Allergies      Review of Systems   Review of Systems   Constitutional:  Negative for chills and fever. HENT:  Negative for voice change. Eyes:  Negative for pain and redness. Respiratory:  Negative for cough and chest tightness. Cardiovascular:  Negative for chest pain and leg swelling.    Gastrointestinal:  Negative for abdominal pain, diarrhea, nausea and vomiting. Genitourinary:  Negative for hematuria. Musculoskeletal:  Positive for arthralgias. Negative for gait problem. Skin:  Negative for color change, pallor and rash. Neurological:  Negative for facial asymmetry, weakness and headaches. Hematological:  Does not bruise/bleed easily. Psychiatric/Behavioral:  Negative for behavioral problems. All other systems reviewed and are negative. Physical Exam   Physical Exam  Vitals and nursing note reviewed. Constitutional:       Comments: 54-year-old male, resting bed, no acute distress   HENT:      Head: Normocephalic and atraumatic. Nose: Nose normal.      Mouth/Throat:      Mouth: Mucous membranes are moist.   Eyes:      Pupils: Pupils are equal, round, and reactive to light. Cardiovascular:      Rate and Rhythm: Normal rate and regular rhythm. Pulses: Normal pulses. Heart sounds: No murmur heard. No friction rub. No gallop. Comments: 2+ radial and ulnar pulses left upper extremity  Pulmonary:      Effort: Pulmonary effort is normal.      Breath sounds: Normal breath sounds. No wheezing, rhonchi or rales. Abdominal:      General: Abdomen is flat. There is no distension. Palpations: Abdomen is soft. Tenderness: There is no abdominal tenderness. Musculoskeletal:         General: Tenderness present. No swelling. Normal range of motion. Cervical back: Normal range of motion. Right lower leg: No edema. Left lower leg: No edema. Comments: There is a deformity of the left shoulder with step-off. Patient is held in abduction and flexion. No axillary paresthesias or upper extremity paresthesias, intact in median/ulnar/radial nerve distributions. Skin:     General: Skin is warm and dry. Capillary Refill: Capillary refill takes less than 2 seconds. Neurological:      General: No focal deficit present. Mental Status: He is alert.    Psychiatric: Mood and Affect: Mood normal.       Diagnostic Study Results     Labs -   No results found for this or any previous visit (from the past 12 hour(s)). Radiologic Studies -   XR SHOULDER LT AP/LAT MIN 2 V   Final Result      Satisfactory reduction of previous anterior dislocation. Hill-Sachs deformity   noted. XR SHOULDER LT AP/LAT MIN 2 V   Final Result      Anterior inferior glenohumeral dislocation of the left shoulder. CT Results  (Last 48 hours)      None          CXR Results  (Last 48 hours)      None            Medical Decision Making   I am the first provider for this patient. I reviewed the vital signs, available nursing notes, past medical history, past surgical history, family history and social history. Vital Signs-Reviewed the patient's vital signs. Patient Vitals for the past 12 hrs:   Temp Pulse Resp BP SpO2   11/10/22 1411 -- 75 17 (!) 145/88 97 %   11/10/22 1356 -- 70 14 (!) 152/88 97 %   11/10/22 1353 98.4 °F (36.9 °C) 91 16 -- 100 %   11/10/22 1351 -- 73 13 (!) 158/78 100 %   11/10/22 1346 -- 73 15 (!) 153/86 100 %   11/10/22 1344 98.4 °F (36.9 °C) 72 12 -- 98 %   11/10/22 1340 -- 70 14 (!) 153/86 100 %   11/10/22 1334 -- 76 16 (!) 137/93 100 %   11/10/22 1227 -- -- -- -- 98 %   11/10/22 1147 98.1 °F (36.7 °C) 99 18 (!) 150/112 98 %     Records Reviewed: Nursing Notes and Old Medical Records    Provider Notes (Medical Decision Making):     41-year-old male presents emerged part with a left shoulder injury. Vital signs are unremarkable. Otherwise normal state of health. Clinically does appear to have a shoulder deformity likely dislocation. Medicate for pain. Patient consented for moderate sedation and reduction, appreciate assistance of Peggi Hatchet with reduction while I provided sedation. ED Course:   Initial assessment performed. The patients presenting problems have been discussed, and they are in agreement with the care plan formulated and outlined with them. I have encouraged them to ask questions as they arise throughout their visit. ED Course as of 11/10/22 1615   Thu Nov 10, 2022   1255 Plain film confirms anterior shoulder dislocation. [MB]   5004 Patient consented, procedural sedation with propofol 140 mg. [MB]   1615 Patient now awake, pain improved. X-ray shows reduction with Hill-Sachs deformity. We will have patient follow-up with Ortho and use shoulder immobilizer. NSAIDs. Patient awake and back at baseline. [MB]      ED Course User Index  [MB] Mg Teran MD     PROCEDURES    Procedure Note - Reduction:    1:45 PM  Performed by Mary Umaña PA-C. Procedure start time: 1:37  Procedure end time: 1:45  Procedure was performed without a block. Medications provided as below: conscious sedation per that procedure note. NVI prior to procedure with reduced ROM of left shoulder  Reduced using skeletal traction  Confirmed successful reduction with xray  Medications   lidocaine (PF) (XYLOCAINE) 10 mg/mL (1 %) injection 10 mL (0 mL Intra artICUlar Held 11/10/22 1307)   morphine injection 4 mg (4 mg IntraVENous Given 11/10/22 1234)   propofoL (DIPRIVAN) 10 mg/mL injection 100 mg (100 mg IntraVENous Given by Provider 11/10/22 1337)     Neurovascular exam was intact following the procedure. Post reduction x-ray ordered. The procedure took 1-15 minutes, and pt tolerated well. Procedure Note - Procedural Sedation:     Indication:  Procedural sedation is required to perform the following procedure:  L shoulder reduction    Informed Consent:  The reason for the procedure as well as the risks, benefits, and alternatives to the procedure have been explained to the patient and He consents to the procedure. The consent for sedation has been signed by the patient/representative, the medical provider and witnessed by the patient's nurse. Anesthesia Risks: The ASA score is ASA 2 - Mild systemic disease    Mallampati Score Reference:   The patient has a patent airway with a Mallampati Classification Score of II (soft palate, uvula, fauces visible). Sedation was performed by:   Meseret Newsome MD  Procedure was performed by:  myself    Pre-Procedure Sedation Assessment:  Sedation Start Time: 0454    Time Out was performed to confirm patient identity, laterality, indication, and contraindications, prior to procedural sedation. Post-Procedure Sedation Assessment:   Sedation End Time: 1350  The patient was sedated with a total of 140mg propofol/DIPRIVAN via IV. Reversal agents and resuscitation equipment were at the bedside. Reversal agents were not required. The indicated procedure was completed and the patient tolerated the sedation well with no complications. Please refer to sedation flowsheet for nursing notes, vital signs, and specific timeline details. Meseret Nwesome MD  2:40 PM    Meseret Newsome MD      Disposition:    Discharged    DISCHARGE PLAN:  1. Discharge Medication List as of 11/10/2022  3:13 PM        START taking these medications    Details   diclofenac potassium (CATAFLAM) 50 mg tablet Take 1 Tablet by mouth two (2) times a day., Normal, Disp-14 Tablet, R-0           CONTINUE these medications which have NOT CHANGED    Details   ibuprofen (MOTRIN) 600 mg tablet Take 1 Tab by mouth every six (6) hours as needed for Pain. Take with food. , Print, Disp-20 Tab, R-0      triamcinolone (ARISTOCORT) 0.5 % topical cream Apply  to affected area two (2) times a day. use thin layer, Normal, Disp-15 g, R-0           2.    Follow-up Information       Follow up With Specialties Details Why Contact Info    Luh Manning MD Internal Medicine Physician In 3 days  [address] [facility] EMERGENCY DEPT Emergency Medicine  If symptoms worsen [address]   [phone]    Dinesh Oreilly MD Orthopedic Surgery In 1 week  [address] [facility]  [phone]            3. Return to ED if worse     Diagnosis     Clinical Impression:   1. Dislocation of left shoulder joint, initial encounter    2. Hill Sachs deformity, left        Attestations:    Ray Paredes MD        Please note that this dictation was completed with Swipp, the computer voice recognition software. Quite often unanticipated grammatical, syntax, homophones, and other interpretive errors are inadvertently transcribed by the computer software. Please disregard these errors. Please excuse any errors that have escaped final proofreading. Thank you.

## 2022-11-10 NOTE — ED TRIAGE NOTES
Pt arrives to ed reporting left shoulder pain after falling on it playing with his son. Positive deformity.

## 2022-11-10 NOTE — ED NOTES
Conscious sedation and post reduction started at this time.  Procedures are being performed by Dr. Regina Osei and Kimmy Ge [place].

## 2023-04-24 ENCOUNTER — HOSPITAL ENCOUNTER (EMERGENCY)
Facility: HOSPITAL | Age: 48
Discharge: HOME | End: 2023-04-24
Payer: MEDICAID

## 2023-04-24 ENCOUNTER — APPOINTMENT (OUTPATIENT)
Facility: HOSPITAL | Age: 48
End: 2023-04-24
Payer: MEDICAID

## 2023-04-24 VITALS
DIASTOLIC BLOOD PRESSURE: 70 MMHG | RESPIRATION RATE: 12 BRPM | HEART RATE: 66 BPM | OXYGEN SATURATION: 99 % | HEIGHT: 70 IN | SYSTOLIC BLOOD PRESSURE: 140 MMHG | BODY MASS INDEX: 30.78 KG/M2 | WEIGHT: 215 LBS | TEMPERATURE: 98.3 F

## 2023-04-24 DIAGNOSIS — M79.644 FINGER PAIN, RIGHT: Primary | ICD-10-CM

## 2023-04-24 PROCEDURE — 73140 X-RAY EXAM OF FINGER(S): CPT

## 2023-04-24 PROCEDURE — 99283 EMERGENCY DEPT VISIT LOW MDM: CPT

## 2023-04-24 RX ORDER — IBUPROFEN 800 MG/1
800 TABLET ORAL EVERY 8 HOURS PRN
Qty: 30 TABLET | Refills: 1 | Status: SHIPPED | OUTPATIENT
Start: 2023-04-24

## 2023-04-24 ASSESSMENT — PAIN DESCRIPTION - LOCATION: LOCATION: FINGER (COMMENT WHICH ONE)

## 2023-04-24 ASSESSMENT — PAIN SCALES - GENERAL: PAINLEVEL_OUTOF10: 7

## 2023-04-24 ASSESSMENT — PAIN - FUNCTIONAL ASSESSMENT: PAIN_FUNCTIONAL_ASSESSMENT: 0-10

## 2023-04-24 ASSESSMENT — PAIN DESCRIPTION - ORIENTATION: ORIENTATION: RIGHT

## 2023-04-24 NOTE — ED PROVIDER NOTES
EMERGENCY DEPARTMENT HISTORY AND PHYSICAL EXAM      Patient Name: Urszula May  MRN: [id number]  YOB: 1975  Provider: ONI Hernandez  PCP: Macky Siemens, MD   Time/Date of evaluation: 3:30 PM EDT on 4/24/23    History of Presenting Illness     Chief Complaint   Patient presents with    Finger Pain       History Provided By: Patient     History Charisma Mendes):   Urszula May is a 49 y.o. male with a PMHX of DM  who presents to the emergency department  by POV C/O right index finger pain for the past week. No injury or increased activity. He is right-hand dominant. No redness or swelling to the finger. No history of similar. He has not been taking anything at home for the symptoms. Past History     Past Medical History:  No past medical history on file. Past Surgical History:  Past Surgical History:   Procedure Laterality Date    ORTHOPEDIC SURGERY         Family History:  No family history on file. Social History:  Social History     Tobacco Use    Smoking status: Never    Smokeless tobacco: Never   Substance Use Topics    Alcohol use: Yes     Comment: social    Drug use: Never       Medications:  No current facility-administered medications for this encounter.      Current Outpatient Medications   Medication Sig Dispense Refill    ibuprofen (ADVIL;MOTRIN) 800 MG tablet Take 1 tablet by mouth every 8 hours as needed for Pain 30 tablet 1    triamcinolone (ARISTOCORT) 0.5 % cream Apply topically 2 times daily (Patient not taking: Reported on 4/24/2023)         Allergies:  No Known Allergies    Social Determinants of Health:  Social Determinants of Health     Tobacco Use: Low Risk     Smoking Tobacco Use: Never    Smokeless Tobacco Use: Never    Passive Exposure: Not on file   Alcohol Use: Not on file   Financial Resource Strain: Not on file   Food Insecurity: Not on file   Transportation Needs: Not on file   Physical Activity: Not on file   Stress: Not on file   Social

## 2023-12-27 ENCOUNTER — HOSPITAL ENCOUNTER (EMERGENCY)
Facility: HOSPITAL | Age: 48
Discharge: HOME | End: 2023-12-27
Payer: MEDICAID

## 2023-12-27 VITALS
OXYGEN SATURATION: 98 % | WEIGHT: 215 LBS | HEIGHT: 70 IN | BODY MASS INDEX: 30.78 KG/M2 | HEART RATE: 77 BPM | RESPIRATION RATE: 14 BRPM | SYSTOLIC BLOOD PRESSURE: 129 MMHG | TEMPERATURE: 98.3 F | DIASTOLIC BLOOD PRESSURE: 84 MMHG

## 2023-12-27 DIAGNOSIS — J11.1 INFLUENZA WITH RESPIRATORY MANIFESTATION OTHER THAN PNEUMONIA: Primary | ICD-10-CM

## 2023-12-27 LAB
FLUAV RNA SPEC QL NAA+PROBE: NOT DETECTED
FLUBV RNA SPEC QL NAA+PROBE: NOT DETECTED
SARS-COV-2 RNA RESP QL NAA+PROBE: NOT DETECTED

## 2023-12-27 PROCEDURE — 87636 SARSCOV2 & INF A&B AMP PRB: CPT

## 2023-12-27 PROCEDURE — 99283 EMERGENCY DEPT VISIT LOW MDM: CPT

## 2023-12-27 RX ORDER — GLIPIZIDE 10 MG/1
10 TABLET ORAL
COMMUNITY

## 2023-12-27 NOTE — DISCHARGE INSTRUCTIONS
COVID/FLU testing is pending. You can check mychart later today for results. Stay well-hydrated  Healthy diet  Contagious precautions, he should avoid others for 3 to 5 days, you should be fever free for 24 hours.   Over-the-counter cold medications, Mucinex, as needed  Tylenol, Motrin as needed  Return to ER if you develop any new or worsening symptoms, shortness of breath, rash, neck stiffness or any new concerns
5

## 2023-12-27 NOTE — ED PROVIDER NOTES
EMERGENCY DEPARTMENT HISTORY & PHYSICAL EXAM    THE [facility] EMERGENCY DEPT  12/27/23, 8:51 AM EST    Clinical Impression:  1. Influenza with respiratory manifestation other than pneumonia        Assessment/Differential Diagnosis:     Ddx flu, COVID, pneumonia, viral illness, bacterial illness all considered    ED Course:   Initial assessment performed. The patients presenting problems have been discussed, and they are in agreement with the care plan formulated and outlined with them. I have encouraged them to ask questions as they arise throughout their visit. Patient presents the ED with 1 day of mild rhinorrhea, no sore throat, + dry cough. No shortness of breath or chest pain. Patient states his worst symptoms is myalgia. Patient endorses slight headache. No neck stiffness. No rash. Patient states his wife had similar symptoms just a few days ago and tested positive for influenza A. Patient does have diabetes for which she takes medication. Patient states his blood sugars are all under good control    Exam with healthy-appearing middle-aged female sitting in exam chair. Vitals are all within normal limits. Patient appears well, in no distress. Nose with slight congestion, throat clear, neck without meningismus, lungs are clear to auscultation with occasional dry cough. Heart regular rate and rhythm. Abdomen benign. No ankle swelling. COVID and flu testing sent in triage, pt aware to check mychart for results. Given close exposure and flu like symptoms, likely influenza  Given exposure, and symptoms, likely flu. Discussed with pt symptomatic care, contagious precautions, return precautions discussed. Pt requesting work note. Medical Chart Review:  I have reviewed triage nursing documentation. Disposition:  Home  in good condition.       Chief Complaint   Patient presents with    Generalized Body Aches    Cough     HPI:    The history is provided by